# Patient Record
Sex: MALE | Race: OTHER | NOT HISPANIC OR LATINO | Employment: FULL TIME | URBAN - METROPOLITAN AREA
[De-identification: names, ages, dates, MRNs, and addresses within clinical notes are randomized per-mention and may not be internally consistent; named-entity substitution may affect disease eponyms.]

---

## 2018-11-15 ENCOUNTER — HISTORICAL (OUTPATIENT)
Dept: ENDOSCOPY | Facility: HOSPITAL | Age: 58
End: 2018-11-15

## 2019-06-22 ENCOUNTER — HISTORICAL (OUTPATIENT)
Dept: ADMINISTRATIVE | Facility: HOSPITAL | Age: 59
End: 2019-06-22

## 2020-12-16 ENCOUNTER — HISTORICAL (OUTPATIENT)
Dept: ADMINISTRATIVE | Facility: HOSPITAL | Age: 60
End: 2020-12-16

## 2020-12-16 LAB
ALBUMIN SERPL-MCNC: 4.4 GM/DL (ref 3.4–5)
ALBUMIN/GLOB SERPL: 1.83 {RATIO} (ref 1.5–2.5)
ALP SERPL-CCNC: 64 UNIT/L (ref 38–126)
ALT SERPL-CCNC: 31 UNIT/L (ref 7–52)
AST SERPL-CCNC: 31 UNIT/L (ref 15–37)
BILIRUB SERPL-MCNC: 0.4 MG/DL (ref 0.2–1)
BILIRUBIN DIRECT+TOT PNL SERPL-MCNC: 0.1 MG/DL (ref 0–0.5)
BILIRUBIN DIRECT+TOT PNL SERPL-MCNC: 0.3 MG/DL
BUN SERPL-MCNC: 11 MG/DL (ref 7–18)
CALCIUM SERPL-MCNC: 9.4 MG/DL (ref 8.5–10.1)
CHLORIDE SERPL-SCNC: 101 MMOL/L (ref 98–107)
CHOLEST SERPL-MCNC: 262 MG/DL (ref 0–200)
CHOLEST/HDLC SERPL: 8.2 {RATIO}
CO2 SERPL-SCNC: 28 MMOL/L (ref 21–32)
CREAT SERPL-MCNC: 1.08 MG/DL (ref 0.6–1.3)
EST. AVERAGE GLUCOSE BLD GHB EST-MCNC: 126 MG/DL
GLOBULIN SER-MCNC: 2.5 GM/DL (ref 1.2–3)
GLUCOSE SERPL-MCNC: 121 MG/DL (ref 74–106)
HBA1C MFR BLD: 6 % (ref 4.4–6.4)
HDLC SERPL-MCNC: 32 MG/DL (ref 35–60)
LDLC SERPL CALC-MCNC: 192 MG/DL (ref 0–129)
POTASSIUM SERPL-SCNC: 4.7 MMOL/L (ref 3.5–5.1)
PROT SERPL-MCNC: 6.8 GM/DL (ref 6.4–8.2)
PSA SERPL-MCNC: 0.39 NG/ML (ref 0–3.5)
SODIUM SERPL-SCNC: 139 MMOL/L (ref 136–145)
TRIGL SERPL-MCNC: 421 MG/DL (ref 30–150)
VLDLC SERPL CALC-MCNC: 84.2 MG/DL

## 2020-12-20 LAB — RAPID GROUP A STREP (OHS): NEGATIVE

## 2021-06-24 ENCOUNTER — HISTORICAL (OUTPATIENT)
Dept: ADMINISTRATIVE | Facility: HOSPITAL | Age: 61
End: 2021-06-24

## 2021-06-24 LAB
EST. AVERAGE GLUCOSE BLD GHB EST-MCNC: 114 MG/DL
HBA1C MFR BLD: 5.6 % (ref 4.4–6.4)

## 2021-10-07 LAB
CHOLEST SERPL-MCNC: 229 MG/DL
HDLC SERPL-MCNC: 33 MG/DL (ref 35–60)
LDLC SERPL CALC-MCNC: 141 MG/DL
TRIGL SERPL-MCNC: 390 MG/DL (ref 30–150)

## 2021-12-20 ENCOUNTER — HISTORICAL (OUTPATIENT)
Dept: ADMINISTRATIVE | Facility: HOSPITAL | Age: 61
End: 2021-12-20

## 2021-12-20 LAB
ALBUMIN SERPL-MCNC: 4.7 GM/DL (ref 3.4–5)
ALBUMIN/GLOB SERPL: 1.81 {RATIO} (ref 1.5–2.5)
ALP SERPL-CCNC: 69 UNIT/L (ref 38–126)
ALT SERPL-CCNC: 22 UNIT/L (ref 7–52)
AST SERPL-CCNC: 24 UNIT/L (ref 15–37)
BILIRUB SERPL-MCNC: 0.8 MG/DL (ref 0.2–1)
BILIRUBIN DIRECT+TOT PNL SERPL-MCNC: 0.1 MG/DL (ref 0–0.5)
BILIRUBIN DIRECT+TOT PNL SERPL-MCNC: 0.7 MG/DL
BUN SERPL-MCNC: 16 MG/DL (ref 7–18)
CALCIUM SERPL-MCNC: 9.9 MG/DL (ref 8.5–10.1)
CHLORIDE SERPL-SCNC: 101 MMOL/L (ref 98–107)
CHOLEST SERPL-MCNC: 272 MG/DL (ref 0–200)
CHOLEST/HDLC SERPL: 7.4 {RATIO}
CO2 SERPL-SCNC: 29 MMOL/L (ref 21–32)
CREAT SERPL-MCNC: 1.11 MG/DL (ref 0.6–1.3)
EST CREAT CLEARANCE SER (OHS): 87.16 ML/MIN
EST. AVERAGE GLUCOSE BLD GHB EST-MCNC: 111 MG/DL
GLOBULIN SER-MCNC: 2.6 GM/DL (ref 1.2–3)
GLUCOSE SERPL-MCNC: 120 MG/DL (ref 74–106)
HBA1C MFR BLD: 5.5 % (ref 4.4–6.4)
HDLC SERPL-MCNC: 37 MG/DL (ref 35–60)
LDLC SERPL CALC-MCNC: 170 MG/DL (ref 0–129)
POTASSIUM SERPL-SCNC: 4.8 MMOL/L (ref 3.5–5.1)
PROT SERPL-MCNC: 7.3 GM/DL (ref 6.4–8.2)
PSA SERPL-MCNC: 0.46 NG/ML (ref 0–3.5)
SODIUM SERPL-SCNC: 140 MMOL/L (ref 136–145)
TRIGL SERPL-MCNC: 444 MG/DL (ref 30–150)
VLDLC SERPL CALC-MCNC: 88.8 MG/DL

## 2022-04-10 ENCOUNTER — HISTORICAL (OUTPATIENT)
Dept: ADMINISTRATIVE | Facility: HOSPITAL | Age: 62
End: 2022-04-10

## 2022-04-29 VITALS
OXYGEN SATURATION: 97 % | OXYGEN SATURATION: 99 % | WEIGHT: 192.88 LBS | SYSTOLIC BLOOD PRESSURE: 149 MMHG | DIASTOLIC BLOOD PRESSURE: 80 MMHG | BODY MASS INDEX: 27.15 KG/M2 | DIASTOLIC BLOOD PRESSURE: 80 MMHG | HEIGHT: 70 IN | SYSTOLIC BLOOD PRESSURE: 132 MMHG | BODY MASS INDEX: 27.62 KG/M2 | WEIGHT: 189.63 LBS

## 2022-05-02 NOTE — HISTORICAL [facility]ER
This is a historical note converted from Shamar. Formatting and pictures may have been removed.  Please reference Shamar for original formatting and attached multimedia. Chief Complaint  wellness  History of Present Illness  61-year-old  male presents office today?for annual wellness examination recheck of chronic conditions including GERD, asthma. ?Carries with him a history of hyperlipidemia and impaired fasting glucose.? Overdue for colon cancer screening colonoscopy. ?Last colonoscopy 2015.? Up-to-date with?vaccination schedules.? Looks as though he may be due for the second Shingrix.  Last PSA in December 2020 was a result of 0.39.  [facility] a few months ago. Had CACS test. Uncertain of results.? Pulm At [facility]. Also sees Derm at [facility]. ?Currently using?daily maintenance inhaler Breo.  Not much?regular cardiovascular activity/physical exercise however stays physically active at his place of employment.? 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  Review of Systems  ?14 point Review of Systems performed with no exceptions for new complaints other than as noted in HPI.  Physical Exam  Vitals & Measurements  HR:?78(Peripheral)? BP:?132/80?  HT:?178?cm? HT:?178.00?cm? WT:?86?kg? WT:?86.000?kg? BMI:?27.14?  Well developed, well nourished, NAD, alert and oriented x4  Vitals reviewed  Head: NCAT  Eyes: EOMI, conjunctiva WNL, pupils symmetric  Ears: TMs and EACs clear  Nose: Mucosa WNL, No discharge, No sinus tenderness  O/P: No erythema or exudate  Neck: supple, FROM, no LA, no thyromegaly, no bruits auscultated  CV: RRR no MRG, peripheral pulses intact  Pulmonary: Effort normal and breath sounds normal, no wheeze  Abdomen: soft, NTND, no HSM; ? NABS; no peritoneal signs ? ??  Musculoskeletal: ?no tenderness, joints wnl for acute changes, FROM, no CCE  Skin: warm, dry, intact  Neuro: no motor/sensory deficits, cranial nerves grossly intact, cerebellar function appears  intact  Lymphadenopathy: no abnormalities noted  Psychiatric: Cooperative, appropriate mood and affect, appears to have normal judgment  ?  Assessment/Plan  1.?Wellness examination?Z00.00  ?Recommend 3-4 days of 30-45 minutes sessions of brisk walking/water aerobics/resistance training as tolerating. ?Recommend limiting excess simple carbohydrates from daily dietary regimen. recommend increase lean protein and vegetable matter.  Wellness labs today  Due for second Shingrix vaccination. ?Up-to-date with other?vaccinations.  Per his report he is up-to-date with colon cancer screenings.? request these reports.  Ordered:  Comprehensive Metabolic Panel, Routine collect, 12/20/21 8:20:00 CST, Blood, Stop date 12/20/21 8:20:00 CST, Lab Collect, Wellness examination, 12/20/21 8:20:00 CST  Lab Collection Request, 12/20/21 8:18:00 CST, [facility]LINK AMB - AFP, 12/20/21 8:18:00 CST, Wellness examination  Lipid Panel, Routine collect, 12/20/21 8:20:00 CST, Blood, Stop date 12/20/21 8:20:00 CST, Lab Collect, Wellness examination, 12/20/21 8:20:00 CST  Preventative Health Care Est 40-64 years [id number] PC, Wellness examination, HLINK AMB - AFP, 12/20/21 8:18:00 CST  Prostate Specific Antigen, Routine collect, 12/20/21 8:20:00 CST, Blood, Stop date 12/20/21 8:20:00 CST, Lab Collect, Wellness examination, 12/20/21 8:20:00 CST  Request Colonoscopy Results, 12/20/21 8:22:00 CST, Wellness examination  ?  2.?GERD - Gastro-esophageal reflux disease?K21.9  Well-controlled on current medication regimen. ?Continue as prescribed.  Ordered:  Clinic Follow up, *Est. 06/20/22 3:00:00 CDT, Order for future visit, IFG (impaired fasting glucose)  HLD (hyperlipidemia)  GERD - Gastro-esophageal reflux disease, HLink AFP  ?  3.?HLD (hyperlipidemia)?E78.5  ?Being closely followed at?[facility].? He is uncertain and/does not remember the name of his physician.  Ordered:  Clinic Follow up, *Est. 06/20/22 3:00:00 CDT, Order for future visit, IFG (impaired fasting  glucose)  HLD (hyperlipidemia)  GERD - Gastro-esophageal reflux disease, HLink AFP  ?  4.?IFG (impaired fasting glucose)?R73.01  ?Diet lifestyle modification as above. ?Repeat A1c/CMP  Ordered:  Clinic Follow up, *Est. 06/20/22 3:00:00 CDT, Order for future visit, IFG (impaired fasting glucose)  HLD (hyperlipidemia)  GERD - Gastro-esophageal reflux disease, HLink AFP  Hemoglobin A1c, Routine collect, 12/20/21 8:20:00 CST, Blood, Stop date 12/20/21 8:20:00 CST, Lab Collect, IFG (impaired fasting glucose), 12/20/21 8:20:00 CST  ?  5.?Asthma?J45.909  ?Seems to be doing well. ?Closely followed by his pulmonologist. ?He does not?recall remember the name of his?pulmonologist.  ?  6.?Encounter for vaccination?Z23  ?Second Shingrix vaccination today.  ?  Return to clinic in 6 months for?recheck.  Referrals  Clinic Follow up, *Est. 06/20/22 3:00:00 CDT, Order for future visit, IFG (impaired fasting glucose)  HLD (hyperlipidemia)  GERD - Gastro-esophageal reflux disease, HLink AFP   Problem List/Past Medical History  Ongoing  Abnormal EKG finding  Anxiety  Fatigue  GERD - Gastro-esophageal reflux disease  HLD (hyperlipidemia)  IFG (impaired fasting glucose)  Solitary pulmonary nodule  Historical  Hearing aid  Tobacco user  Wellness examination  Procedure/Surgical History  Colonoscopy (None) (11/15/2018)  Colonoscopy, flexible; with removal of tumor(s), polyp(s), or other lesion(s) by snare technique (11/15/2018)  Excision of Cecum, Via Natural or Artificial Opening Endoscopic (11/15/2018)  Excision of Rectum, Via Natural or Artificial Opening Endoscopic (11/15/2018)  Polypectomy (None) (11/15/2018)  Sinus surgery   Medications  biotin  Breo Ellipta 100 mcg-25 mcg/inh inhalation powder, 1 puff(s), INH, Daily  omeprazole 40 mg oral DR capsule, See Instructions  Ventolin HFA 90 mcg/inh inhalation aerosol, 2 puff(s), INH, q4hr  Vitamin D  Allergies  No Known Allergies  Social History  Abuse/Neglect  No,  12/20/2021  Alcohol  Current, Beer, 3-5 times per week, 03/07/2018  Home/Environment  Lives with Spouse., 03/07/2018  Substance Use  Never, 12/11/2018  Tobacco  10 or more cigarettes (1/2 pack or more)/day in last 30 days, No, 12/20/2021  Family History  Asthma: Father.  Immunizations  Vaccine Date Status   zoster vaccine, inactivated 12/20/2021 Given   influenza virus vaccine, inactivated 11/17/2021 Recorded   zoster vaccine, inactivated 06/24/2021 Given   COVID-19 MRNA, LNP-S, PF- Pfizer 03/25/2021 Given   COVID-19 MRNA, LNP-S, PF- Pfizer 03/04/2021 Given   tetanus/diphtheria/pertussis, acel(Tdap) 12/16/2020 Given   influenza virus vaccine, inactivated 11/13/2020 Recorded   influenza virus vaccine, inactivated 11/04/2019 Given   influenza virus vaccine, inactivated 11/15/2018 Recorded   influenza virus vaccine, inactivated 11/13/2017 Recorded   pneumococcal 23-polyvalent vaccine 12/03/2015 Recorded   influenza virus vaccine, inactivated 12/04/2014 Recorded   pneumococcal 13-valent conjugate vaccine 01/29/2014 Recorded   hepatitis A-hepatitis B vaccine 03/30/2010 Recorded   hepatitis A-hepatitis B vaccine 10/27/2009 Recorded   tetanus/diphtheria/pertussis, acel(Tdap) 09/14/2009 Recorded   hepatitis A-hepatitis B vaccine 09/14/2009 Recorded   Health Maintenance  Health Maintenance  ???Pending?(in the next year)  ??? ??Due?  ??? ? ? ?Colorectal Screening due??11/15/21??and every 3??year(s)  ??? ? ? ?ADL Screening due??12/20/21??and every 1??year(s)  ??? ? ? ?Lung Cancer Screening due??12/20/21??and every 1??year(s)  ??? ??Due In Future?  ??? ? ? ?Obesity Screening not due until??01/01/22??and every 1??year(s)  ??? ? ? ?Smoking Cessation not due until??01/01/22??and every 1??year(s)  ??? ? ? ?Alcohol Misuse Screening not due until??01/02/22??and every 1??year(s)  ??? ? ? ?Asthma Management-Asthma Medication Prescribed not due until??06/24/22??and every 1??year(s)  ??? ? ? ?Diabetes Screening not due  until??06/24/22??and every 1??year(s)  ???Satisfied?(in the past 1 year)  ??? ??Satisfied?  ??? ? ? ?Alcohol Misuse Screening on??12/20/21.??Satisfied by Robb James NP  ??? ? ? ?Aspirin Therapy for CVD Prevention on??12/20/21.??Satisfied by Robb James NP  ??? ? ? ?Asthma Management-Asthma Medication Prescribed on??08/23/21.??Satisfied by Arnold Lara MD  ??? ? ? ?Blood Pressure Screening on??12/20/21.??Satisfied by Selene Najera LPN  ??? ? ? ?Body Mass Index Check on??12/20/21.??Satisfied by Selene Najera LPN  ??? ? ? ?Depression Screening on??12/20/21.??Satisfied by Robb James NP  ??? ? ? ?Diabetes Screening on??06/24/21.??Satisfied by Micheal Knapp  ??? ? ? ?Influenza Vaccine on??11/17/21.??Satisfied by Selene Najera LPN  ??? ? ? ?Obesity Screening on??12/20/21.??Satisfied by Selene Najera LPN  ??? ? ? ?Smoking Cessation on??12/20/21.??Satisfied by Robb James NP  ?      Physician?was in the building when patient was?seen and examined by the nurse practitioner.? I concur with the?assessment/plan/management?of the patient.

## 2022-05-02 NOTE — HISTORICAL [facility] C[facility]ER
This is a historical note converted from Shamar. Formatting and pictures may have been removed.  Please reference Shamar for original formatting and attached multimedia. Chief Complaint  6 month recheck  History of Present Illness  stress level is getting better, still?doing well without Lexapro  he didnt do cacs,?he?does not really have much interest in doing that  He quit smoking in February  No new cardiac or respiratory complaints?even on exertion  Working on getting consistent with diet and exercise  Taking his?Prilosec daily without any problems  Discussed his?elevated sugar?6 months ago at 120, will repeat his A1c today  Review of Systems  No new complaints except as explained HPI  ?  Physical Exam  Vitals & Measurements  HR:?76(Peripheral)? BP:?122/80?  HT:?178?cm? HT:?178.00?cm? WT:?89.5?kg? WT:?89.500?kg? BMI:?28.25?  ?  PHYSICAL EXAM  ?   [facility] patient in NAD, ?AFVSS  HEENT - no acute abnormality  ??????????????? oropharynx WNL  HEART - RRR  LUNGS -? CTA  ABDOMEN - benign, NTND;? no peritoneal signs  EXTREMITIES - No CCE  SKIN - warm, dry, intact  PSYCH - affect appropriate;? alert and oriented  NEURO- no new deficits noted;? cranial nerves grossly intact  ?  Assessment/Plan  1.?GERD - Gastro-esophageal reflux disease?K21.9  ?Doing well as long as he takes his meds  Ordered:  Office/Outpatient Visit Level 4 Established [id number] PC, GERD - Gastro-esophageal reflux disease  IFG (impaired fasting glucose)  Solitary pulmonary nodule  Anxiety, [facility]LINK AMB - AFP, 06/24/21 8:44:00 CDT  ?  2.?Anxiety?F41.9  ?doing much better, still not requiring Lexapro  Ordered:  Office/Outpatient Visit Level 4 Established [id number] PC, GERD - Gastro-esophageal reflux disease  IFG (impaired fasting glucose)  Solitary pulmonary nodule  Anxiety, [facility]LINK AMB - AFP, 06/24/21 8:44:00 CDT  ?  3.?Solitary pulmonary nodule?R91.1  ?plan repeat chest CT-will?check with imaging center?and see if he has an appointment already and if not we will  have him call them to make 1  Ordered:  Office/Outpatient Visit Level 4 Established [id number] PC, GERD - Gastro-esophageal reflux disease  IFG (impaired fasting glucose)  Solitary pulmonary nodule  Anxiety, [facility]LINK AMB - AFP, 06/24/21 8:44:00 CDT  ?  4.?IFG (impaired fasting glucose)?R73.01  ?had sugar of 120 in december  check A1c today  Ordered:  Clinic Follow up, *Est. 12/24/21 3:00:00 CST, PLEASE MAKE THIS A 30 MINUTE CPX, Order for future visit, IFG (impaired fasting glucose), HLink AFP  Office/Outpatient Visit Level 4 Established [id number] PC, GERD - Gastro-esophageal reflux disease  IFG (impaired fasting glucose)  Solitary pulmonary nodule  Anxiety, HLINK AMB - AFP, 06/24/21 8:44:00 CDT  ?  5.?Encounter for vaccination?Z23  ?Shingrix No. 1  ?  6.?HLD (hyperlipidemia)?E78.5  ?Recommend coronary calcium score?since his last cholesterol was?about 260;?discussed the benefits of?knowing if he has plaque in his coronary arteries;?he will let us know if he changes his mind  ?  Orders:  CT Lung Cancer Screening, Routine, 09/24/21 10:12:00 CDT, Other (please specify), Lung cancer screening, >= 30 pk-yr smoking history in last 15 yrs, None, Ambulatory, [facility] He is due in September 2021 Please call patient to schedule, Rad Type, Order for future v...  shingrix #1 today  rtc 6 months cpx  Referrals  Clinic Follow up, *Est. 12/24/21 3:00:00 CST, PLEASE MAKE THIS A 30 MINUTE CPX, Order for future visit, IFG (impaired fasting glucose), HLink AFP  Clinic Follow up, Order for future visit   Problem List/Past Medical History  Ongoing  Abnormal EKG finding  Anxiety  Fatigue  GERD - Gastro-esophageal reflux disease  HLD (hyperlipidemia)  IFG (impaired fasting glucose)  Solitary pulmonary nodule  Historical  Hearing aid  Tobacco user  Wellness examination  Procedure/Surgical History  Colonoscopy (None) (11/15/2018)  Colonoscopy, flexible; with removal of tumor(s), polyp(s), or other lesion(s) by snare technique  (11/15/2018)  Excision of Cecum, Via Natural or Artificial Opening Endoscopic (11/15/2018)  Excision of Rectum, Via Natural or Artificial Opening Endoscopic (11/15/2018)  Polypectomy (None) (11/15/2018)  Sinus surgery   Medications  biotin  Breo Ellipta 100 mcg-25 mcg/inh inhalation powder, 1 puff(s), INH, Daily  omeprazole 40 mg oral DR capsule, See Instructions  Vitamin D  Allergies  No Known Allergies  Social History  Abuse/Neglect  No, 06/24/2021  Alcohol  Current, Beer, 3-5 times per week, 03/07/2018  Home/Environment  Lives with Spouse., 03/07/2018  Substance Use  Never, 12/11/2018  Tobacco  10 or more cigarettes (1/2 pack or more)/day in last 30 days, No, 06/24/2021  Family History  Asthma: Father.  Immunizations  Vaccine Date Status   zoster vaccine, inactivated 06/24/2021 Given   COVID-19 MRNA, LNP-S, PF- Pfizer 03/25/2021 Given   COVID-19 MRNA, LNP-S, PF- Pfizer 03/04/2021 Given   tetanus/diphtheria/pertussis, acel(Tdap) 12/16/2020 Given   influenza virus vaccine, inactivated 11/13/2020 Recorded   influenza virus vaccine, inactivated 11/04/2019 Given   influenza virus vaccine, inactivated 11/15/2018 Recorded   influenza virus vaccine, inactivated 11/13/2017 Recorded   pneumococcal 23-polyvalent vaccine 12/03/2015 Recorded   influenza virus vaccine, inactivated 12/04/2014 Recorded   pneumococcal 13-valent conjugate vaccine 01/29/2014 Recorded   hepatitis A-hepatitis B vaccine 03/30/2010 Recorded   hepatitis A-hepatitis B vaccine 10/27/2009 Recorded   tetanus/diphtheria/pertussis, acel(Tdap) 09/14/2009 Recorded   hepatitis A-hepatitis B vaccine 09/14/2009 Recorded   Health Maintenance  Health Maintenance  ???Pending?(in the next year)  ??? ??OverDue  ??? ? ? ?Alcohol Misuse Screening due??01/02/21??and every 1??year(s)  ??? ??Due?  ??? ? ? ?ADL Screening due??06/24/21??and every 1??year(s)  ??? ? ? ?Aspirin Therapy for CVD Prevention due??06/24/21??and every 1??year(s)  ??? ? ? ?Depression Screening  due??06/24/21??Unknown Frequency  ??? ??Due In Future?  ??? ? ? ?Influenza Vaccine not due until??10/01/21??and every 1??day(s)  ??? ? ? ?Colorectal Screening not due until??11/15/21??and every 3??year(s)  ??? ? ? ?Diabetes Screening not due until??12/16/21??and every 1??year(s)  ??? ? ? ?Obesity Screening not due until??01/01/22??and every 1??year(s)  ???Satisfied?(in the past 1 year)  ??? ??Satisfied?  ??? ? ? ?Asthma Management-Asthma Medication Prescribed on??06/24/21.  ??? ? ? ?Blood Pressure Screening on??06/24/21.??Satisfied by Selene Najera LPN  ??? ? ? ?Body Mass Index Check on??06/24/21.??Satisfied by Selene Najera LPN  ??? ? ? ?Diabetes Screening on??06/24/21.??Satisfied by Micheal Knapp  ??? ? ? ?Influenza Vaccine on??11/13/20.??Satisfied by Selene Najera LPN  ??? ? ? ?Lipid Screening on??12/16/20.??Satisfied by Crista Banda  ??? ? ? ?Obesity Screening on??06/24/21.??Satisfied by Selene Najera LPN  ??? ? ? ?Tetanus Vaccine on??12/16/20.??Satisfied by Destini Weinstein MA  ?

## 2022-05-02 NOTE — HISTORICAL [facility]
This is a historical note converted from Shamar. Formatting and pictures may have been removed.  Please reference Shamar for original formatting and attached multimedia. Chief Complaint  hx of asthma, sob since last night, tried breathing tx this morning with little relief  History of Present Illness  58-year-old?known asthmatic?presents to clinic with acute onset shortness of breath since last night.? States?felt?acid reflux?triggered the symptoms.? Complains of?shortness of breath and wheezing.? Breathing treatments 6 AM today some relief.? No fever, no chest pain, no palpitations.? Smokes tobacco.  Review of Systems  Constitutional : _No fatigue, No weakness,?No Fever  HEENT : _No sore throat,?no difficulty swallowing  Neck : Negative except as documented in History of present illness  Respiratory : _Coughing,?shortness of breath and wheezing  Cardiovascular : _No chest pain, no palpitations, no edema  Gastrointestinal : _No nausea,?vomiting or diarrhea.? No abdominal pain  Integumentary : _No skin rash or abnormal lesion  Neurologic_no headache, no dizziness  Physical Exam  Vitals & Measurements  T:?36.9? ?C (Oral)? HR:?100(Peripheral)? RR:?25? BP:?149/80? SpO2:?99%?  HT:?178?cm? WT:?87.5?kg?  General : Alert, oriented,?no apparent distress, Afebrile  Neck - supple, no lymphadenopathy  HENT :_. ?Pharynx mild redness and swelling, no exudate  Respiratory : Bilateral?coarse breath sounds,?expiratory wheezing.? Rhonchi?left lung fields  Cardiovascular :_Regular Heart rate and rhythm?without murmurs  Integumentary : No rashes  ?   After the DuoNeb treatment:?Air movements much improved bilateral.? Scattered rhonchi and expiratory wheezing.? O2 sats 94-95%  Assessment/Plan  1.?Acute asthma exacerbation?J45.901  ? Discussed the physical findings, Celestone 6 mg IM today.? DuoNeb?breathing treatment. ?Patient tolerated well.? With improving symptoms discharged home with spouse.  Continue?Ventolin as needed?for asthma  symptoms.? Caution with jitteriness.? Medications as directed.? ER precautions with any acute change in symptoms  Ordered:  amoxicillin, 875 mg = 1 tab(s), Oral, BID, X 7 day(s), # 14 tab(s), 0 Refill(s), Pharmacy: [id number]  Office/Outpatient Visit Level 3 Established [id number] PC, Acute asthma exacerbation  Acute dyspnea, [facility], 06/22/19 16:22:00 CDT  ?  2.?Acute dyspnea?R06.00  ? reviewed the x-rays, no concerns of acute finding.? Radiology final results will be monitored and reported.? With any acute change call or return to clinic. ?Follow-up with primary M.D.  Ordered:  Office/Outpatient Visit Level 3 Established [id number] PC, Acute asthma exacerbation  Acute dyspnea, [facility]-RR, 06/22/19 16:22:00 CDT  ?  Orders:  pantoprazole, 40 mg = 1 tab(s), Oral, Daily, # 60 tab(s), 0 Refill(s), Pharmacy: [id number]   Problem List/Past Medical History  Ongoing  Abnormal EKG finding  Fatigue  GERD - Gastro-esophageal reflux disease  H/O: asthma  Hearing aid  High cholesterol  Solitary pulmonary nodule  Tobacco user  Historical  Wellness examination  Procedure/Surgical History  Colonoscopy (None) (11/15/2018)  Colonoscopy, flexible; with removal of tumor(s), polyp(s), or other lesion(s) by snare technique (11/15/2018)  Excision of Cecum, Via Natural or Artificial Opening Endoscopic (11/15/2018)  Excision of Rectum, Via Natural or Artificial Opening Endoscopic (11/15/2018)  Polypectomy (None) (11/15/2018)  Sinus surgery   Medications  Advair HFA 45 mcg-21 mcg/inh inhalation aerosol, 2 puff(s), INH, BID  amoxicillin 875 mg oral tablet, 875 mg= 1 tab(s), Oral, BID  aspirin 81 mg oral tablet  ATORVASTATIN 40 MG TABLET, 40 mg= 1 tab(s), Oral, Daily  biotin  escitalopram 10 mg oral tablet, See Instructions, 2 refills  Protonix 40 mg ORAL enteric coated tablet, 40 mg= 1 tab(s), Oral, Daily  Ventolin HFA 90 mcg/inh inhalation aerosol, See Instructions, 1 refills  Vitamin D  Allergies  No Known Allergies  No Known  Medication Allergies  Social History  Abuse/Neglect  No, 06/22/2019  Alcohol  Current, Beer, 3-5 times per week, 03/07/2018  Home/Environment  Lives with Spouse., 03/07/2018  Substance Use  Never, 12/11/2018  Tobacco  10 or more cigarettes (1/2 pack or more)/day in last 30 days, N/A, 06/22/2019  Family History  Asthma: Father.  Immunizations  Vaccine Date Status   influenza virus vaccine, inactivated 11/13/2017 Recorded   pneumococcal 23-polyvalent vaccine 12/03/2015 Recorded   pneumococcal 13-valent conjugate vaccine 01/29/2014 Recorded   hepatitis A-hepatitis B vaccine 03/30/2010 Recorded   hepatitis A-hepatitis B vaccine 10/27/2009 Recorded   tetanus/diphtheria/pertussis, acel(Tdap) 09/14/2009 Recorded   hepatitis A-hepatitis B vaccine 09/14/2009 Recorded   Health Maintenance  Health Maintenance  ???Pending?(in the next year)  ??? ??OverDue  ??? ? ? ?Diabetes Screening due??and every?  ??? ? ? ?Alcohol Misuse Screening due??01/01/19??and every 1??year(s)  ??? ? ? ?Smoking Cessation due??01/01/19??and every 1??year(s)  ??? ??Due?  ??? ? ? ?ADL Screening due??06/22/19??and every 1??year(s)  ??? ? ? ?Asthma Management-Asthma Education due??06/22/19??and every 6??month(s)  ??? ? ? ?Asthma Management-Spirometry due??06/22/19??Variable frequency  ??? ? ? ?Asthma Management-[place] Flow due??06/22/19??Variable frequency  ??? ? ? ?Asthma Management-Written Action Plan due??06/22/19??and every 6??month(s)  ??? ? ? ?Depression Screening due??06/22/19??and every?  ??? ? ? ?Lung Cancer Screening due??06/22/19??and every 1??year(s)  ??? ??Due In Future?  ??? ? ? ?Tetanus Vaccine not due until??09/14/19??and every 10??year(s)  ??? ? ? ?Aspirin Therapy for CVD Prevention not due until??11/15/19??and every 1??year(s)  ??? ? ? ?Obesity Screening not due until??01/01/20??and every 1??year(s)  ??? ? ? ?Blood Pressure Screening not due until??02/18/20??and every 1??year(s)  ??? ? ? ?Body Mass Index Check not due  until??02/18/20??and every 1??year(s)  ???Satisfied?(in the past 1 year)  ??? ??Satisfied?  ??? ? ? ?Aspirin Therapy for CVD Prevention on??11/15/18.  ??? ? ? ?Asthma Management-Asthma Medication Prescribed on??06/22/19.??Satisfied by Janet Sethi MD  ??? ? ? ?Blood Pressure Screening on??06/22/19.??Satisfied by Girish Vidal LPN.  ??? ? ? ?Body Mass Index Check on??02/18/19.??Satisfied by Melissa Wiggins  ??? ? ? ?Diabetes Screening on??12/11/18.??Satisfied by Micheal Knapp  ??? ? ? ?Influenza Vaccine on??11/15/18.??Satisfied by Donna OHARA, Risa Millard  ??? ? ? ?Lipid Screening on??12/11/18.??Satisfied by Micheal Knapp  ??? ? ? ?Obesity Screening on??06/22/19.??Satisfied by Girish Vidal LPN.  ?  ?

## 2022-05-02 NOTE — HISTORICAL [facility] CERNER
This is a historical note converted from Shamar. Formatting and pictures may have been removed.  Please reference Shamar for original formatting and attached multimedia. Chief Complaint  CPX  History of Present Illness  60-year-old  male presents to office today for scheduled annual wellness exam and recheck of chronic conditions including?GERD and fatigue/anxiety.? Reports that he is up-to-date with health maintenance preventive screenings. ?He expresses that he obtained?most recent colon cancer screening colonoscopy in 2019. ?I will request nursing to obtain these records.? He is due for a Tdap vaccination. ?He will receive this?during the office visit today. ?He is already received the 2020 influenza vaccination.? Received both Prevnar 13 and Pneumovax 23?vaccinations. ?We will repeat these beginning at age 65.? Last PSA?0.88. ?He is a current tobacco user. ?Reports?approximately half pack per day?use.? Also refuses?dips/snuff.? Reports that 1 can will last him approximately 3 days.? Reports that he primarily?uses smokeless tobacco?while at work. ?He is employed full-time at [employer]. ?Plans to retire in the next 1-2 years.? Expresses that he is no longer taking?prescription Lexapro for his?anxiety.? Expresses that he found that the medication did not do a whole lot for me.? A lengthy discussion was had concerning the various?modalities and treatment options for anxiety.? He expresses that he will notify us?if he desires to?try other modalities.? No cardiovascular exercise. ?No particular dietary restrictions/limitations from daily dietary intake.? Option for Shingrix vaccination was discussed?at length. ?He expresses that he will?discuss this with his wife and notify us if he?desires to receive the Shingrix vaccination series.  Review of Systems  ?  ?14 point Review of Systems performed with no exceptions for new complaints other than as noted in HPI.  ?  Physical Exam  Vitals &  Measurements  HR:?80(Peripheral)? BP:?130/88?  HT:?178?cm? WT:?90.6?kg?  ?  PHYSICAL EXAM  ?   WDWN patient in NAD, ?AFVSS  HEENT - no acute abnormality  ??????????????? oropharynx WNL  HEART - RRR  LUNGS -? CTA  ABDOMEN - benign, NTND;? no peritoneal signs  EXTREMITIES - No CCE  SKIN - warm, dry, intact  PSYCH - affect appropriate;? alert and oriented  NEURO- no new deficits noted;? cranial nerves grossly intact  ?  Assessment/Plan  1.?Wellness examination?Z00.00  ?Wellness labs today. ?Recommend initiating some cardiovascular exercise 3-4 days weekly.? Recommend limiting simple carbohydrates from daily dietary intake.? Please obtain?records from gastroenterology/colon cancer screening colonoscopy records.? Will check PSA level today.  Ordered:  Comprehensive Metabolic Panel, Routine collect, 12/16/20 8:09:00 CST, Blood, Order for future visit, Stop date 12/16/20 8:09:00 CST, Lab Collect, Wellness examination, 12/16/20 8:09:00 CST  Lipase Level, Routine collect, 12/16/20 8:09:00 CST, Blood, Order for future visit, Stop date 12/16/20 8:09:00 CST, Lab Collect, Wellness examination, 12/16/20 8:09:00 CST  Preventative Health Care Est 40-64 years [id number] PC, Wellness examination, [facility], 12/16/20 8:09:00 CST  Prostate Specific Antigen, Routine collect, 12/16/20 8:09:00 CST, Blood, Order for future visit, Stop date 12/16/20 8:09:00 CST, Lab Collect, Wellness examination, 12/16/20 8:09:00 CST  ?  2.?Tobacco user?Z72.0  ?Nicotine/tobacco?use cessation discussed at length.  Ordered:  Lab Collection Request, 12/16/20 8:09:00 CST, [facility] - [facility], 12/16/20 8:09:00 CST, Tobacco user  Office/Outpatient Visit Level 2 Established [id number] PC, Tobacco user  Fatigue  GERD - Gastro-esophageal reflux disease, [facility], 12/16/20 8:09:00 CST  ?  3.?Fatigue?R53.83  ?Stable. ?Expresses he is?attempting to limit?excess stressors?from his workplace environment.  Ordered:  Clinic Follow up, *Est. 06/16/21 3:00:00 CDT, Order for  future visit, Fatigue  GERD - Gastro-esophageal reflux disease, [facility]  Office/Outpatient Visit Level 2 Established [id number] PC, Tobacco user  Fatigue  GERD - Gastro-esophageal reflux disease, [facility], 12/16/20 8:09:00 CST  ?  4.?GERD - Gastro-esophageal reflux disease?K21.9  ?Well-controlled on prescription medication. ?Continue as prescribed.  Ordered:  Clinic Follow up, *Est. 06/16/21 3:00:00 CDT, Order for future visit, Fatigue  GERD - Gastro-esophageal reflux disease, [facility]  Office/Outpatient Visit Level 2 Established [id number] PC, Tobacco user  Fatigue  GERD - Gastro-esophageal reflux disease, [facility], 12/16/20 8:09:00 CST  ?  5.?Anxiety?F41.9  ?Lengthy discussion as?mentioned in HPI.? He is to notify us if he?desires?alternate treatment modalities/medication?options for treatment of anxiety.  ?  Orders:  Hemoglobin A1c, Routine collect, 12/16/20 8:09:00 CST, Blood, Order for future visit, Stop date 12/16/20 8:09:00 CST, Lab Collect, Elevated fasting glucose, 12/16/20 8:09:00 CST  Referrals  Clinic Follow up, *Est. 06/16/21 3:00:00 CDT, Order for future visit, Fatigue  GERD - Gastro-esophageal reflux disease, [facility]   Problem List/Past Medical History  Ongoing  Abnormal EKG finding  Anxiety  Fatigue  GERD - Gastro-esophageal reflux disease  Hearing aid  Solitary pulmonary nodule  Tobacco user  Historical  Wellness examination  Procedure/Surgical History  Colonoscopy (None) (11/15/2018)  Colonoscopy, flexible; with removal of tumor(s), polyp(s), or other lesion(s) by snare technique (11/15/2018)  Excision of Cecum, Via Natural or Artificial Opening Endoscopic (11/15/2018)  Excision of Rectum, Via Natural or Artificial Opening Endoscopic (11/15/2018)  Polypectomy (None) (11/15/2018)  Sinus surgery   Medications  albuterol CFC free 90 mcg/inh inhalation aerosol with adapter, 2 puff(s), INH, QID, PRN  aspirin 81 mg oral tablet  biotin  Breo Ellipta 200 mcg-25 mcg/inh inhalation powder, 1  puff(s), INH, Daily  escitalopram 10 mg oral tablet, See Instructions,? ?Not taking  omeprazole 40 mg oral DR capsule, 40 mg= 1 cap(s), Oral, Daily  Ventolin HFA 90 mcg/inh inhalation aerosol, See Instructions  Vitamin D  Allergies  No Known Allergies  Social History  Abuse/Neglect  No, 12/16/2020  No, 12/12/2019  Alcohol  Current, Beer, 3-5 times per week, 03/07/2018  Home/Environment  Lives with Spouse., 03/07/2018  Substance Use  Never, 12/11/2018  Tobacco  10 or more cigarettes (1/2 pack or more)/day in last 30 days, No, 12/16/2020  10 or more cigarettes (1/2 pack or more)/day in last 30 days, No, 12/12/2019  Family History  Asthma: Father.  Immunizations  Vaccine Date Status   tetanus/diphtheria/pertussis, acel(Tdap) 12/16/2020 Given   influenza virus vaccine, inactivated 11/13/2020 Recorded   influenza virus vaccine, inactivated 11/04/2019 Given   influenza virus vaccine, inactivated 11/15/2018 Recorded   influenza virus vaccine, inactivated 11/13/2017 Recorded   pneumococcal 23-polyvalent vaccine 12/03/2015 Recorded   influenza virus vaccine, inactivated 12/04/2014 Recorded   pneumococcal 13-valent conjugate vaccine 01/29/2014 Recorded   hepatitis A-hepatitis B vaccine 03/30/2010 Recorded   hepatitis A-hepatitis B vaccine 10/27/2009 Recorded   tetanus/diphtheria/pertussis, acel(Tdap) 09/14/2009 Recorded   hepatitis A-hepatitis B vaccine 09/14/2009 Recorded   Health Maintenance  Health Maintenance  ???Pending?(in the next year)  ??? ??OverDue  ??? ? ? ?Aspirin Therapy for CVD Prevention due??11/15/19??and every 1??year(s)  ??? ? ? ?Obesity Screening due??01/01/20??and every 1??year(s)  ??? ? ? ?Smoking Cessation due??01/01/20??and every 1??year(s)  ??? ? ? ?Alcohol Misuse Screening due??01/02/20??and every 1??year(s)  ??? ? ? ?Asthma Management-Asthma Medication Prescribed due??12/11/20??and every 1??year(s)  ??? ??Due?  ??? ? ? ?Body Mass Index Check due??12/11/20??and every 1??year(s)  ??? ? ? ?ADL Screening  due??12/16/20??and every 1??year(s)  ??? ? ? ?Depression Screening due??12/16/20??Unknown Frequency  ??? ? ? ?Lung Cancer Screening due??12/16/20??and every 1??year(s)  ??? ? ? ?Zoster Vaccine due??12/16/20??Unknown Frequency  ??? ??Due In Future?  ??? ? ? ?Influenza Vaccine not due until??10/01/21??and every 1??day(s)  ???Satisfied?(in the past 1 year)  ??? ??Satisfied?  ??? ? ? ?Asthma Management-Asthma Medication Prescribed on??01/27/20.??Satisfied by Arnold Lara MD  ??? ? ? ?Blood Pressure Screening on??12/16/20.??Satisfied by Destini Weinstein MA  ??? ? ? ?Influenza Vaccine on??11/13/20.??Satisfied by Selene Najera LPN  ??? ? ? ?Tetanus Vaccine on??12/16/20.??Satisfied by Destini Weinstein MA  ?      Physician?was in the building when patient was?seen and examined by the nurse practitioner.? I concur with the?assessment/plan/management?of the patient.

## 2022-06-21 PROBLEM — K21.9 GASTROESOPHAGEAL REFLUX DISEASE: Status: ACTIVE | Noted: 2022-06-21

## 2022-06-21 PROBLEM — F41.1 GENERALIZED ANXIETY DISORDER: Status: ACTIVE | Noted: 2022-06-21

## 2022-06-21 PROBLEM — J43.2 CENTRILOBULAR EMPHYSEMA: Status: ACTIVE | Noted: 2022-06-21

## 2022-06-21 PROBLEM — R73.01 IMPAIRED FASTING GLUCOSE: Status: ACTIVE | Noted: 2022-06-21

## 2022-06-21 PROBLEM — R94.31 ABNORMAL ELECTROCARDIOGRAPHY: Status: ACTIVE | Noted: 2022-06-21

## 2022-06-21 PROBLEM — E78.5 HYPERLIPIDEMIA: Status: ACTIVE | Noted: 2022-06-21

## 2022-06-21 PROBLEM — R53.83 FATIGUE: Status: ACTIVE | Noted: 2022-06-21

## 2022-06-21 PROBLEM — R91.1 SOLITARY PULMONARY NODULE: Status: ACTIVE | Noted: 2022-06-21

## 2022-09-17 ENCOUNTER — HISTORICAL (OUTPATIENT)
Dept: ADMINISTRATIVE | Facility: HOSPITAL | Age: 62
End: 2022-09-17

## 2022-12-21 PROBLEM — F41.9 ANXIETY DISORDER, UNSPECIFIED: Status: ACTIVE | Noted: 2022-06-21

## 2023-02-28 PROCEDURE — 86803 HEPATITIS C AB TEST: CPT | Performed by: FAMILY MEDICINE

## 2024-07-30 PROBLEM — E78.2 MIXED HYPERLIPIDEMIA: Status: ACTIVE | Noted: 2022-06-21

## 2024-11-14 ENCOUNTER — OFFICE VISIT (OUTPATIENT)
Dept: URGENT CARE | Facility: CLINIC | Age: 64
End: 2024-11-14
Payer: COMMERCIAL

## 2024-11-14 VITALS
TEMPERATURE: 98 F | HEART RATE: 73 BPM | DIASTOLIC BLOOD PRESSURE: 89 MMHG | SYSTOLIC BLOOD PRESSURE: 145 MMHG | HEIGHT: 70 IN | RESPIRATION RATE: 17 BRPM | WEIGHT: 166 LBS | OXYGEN SATURATION: 96 % | BODY MASS INDEX: 23.77 KG/M2

## 2024-11-14 DIAGNOSIS — J32.9 SINUSITIS, UNSPECIFIED CHRONICITY, UNSPECIFIED LOCATION: Primary | ICD-10-CM

## 2024-11-14 DIAGNOSIS — J45.901 EXACERBATION OF ASTHMA, UNSPECIFIED ASTHMA SEVERITY, UNSPECIFIED WHETHER PERSISTENT: ICD-10-CM

## 2024-11-14 RX ORDER — PREDNISONE 10 MG/1
30 TABLET ORAL DAILY
Qty: 9 TABLET | Refills: 0 | Status: SHIPPED | OUTPATIENT
Start: 2024-11-14 | End: 2024-11-17

## 2024-11-14 RX ORDER — DOXYCYCLINE 100 MG/1
100 CAPSULE ORAL 2 TIMES DAILY
Qty: 14 CAPSULE | Refills: 0 | Status: SHIPPED | OUTPATIENT
Start: 2024-11-14 | End: 2024-11-21

## 2024-11-14 NOTE — PROGRESS NOTES
"Subjective:      Patient ID: Ronald Mondragon is a 64 y.o. male.    Vitals:  height is 5' 10" (1.778 m) and weight is 75.3 kg (166 lb). His temperature is 98.3 °F (36.8 °C). His blood pressure is 145/89 (abnormal) and his pulse is 73. His respiration is 17 and oxygen saturation is 96%.     Chief Complaint: Cough     Patient is a 64 y.o. male who presents to urgent care with complaints of cough, sinus congestion, runny nose, sinus pressure, chest congestion the past 3 days.  States he does have asthma and it has been flaring up.  Has had some shortness of breath and wheezing.  Has been using his albuterol inhaler.  Denies any fever.  Alleviating factors include mucinex with no relief.         Constitution: Negative.   HENT:  Positive for sinus pressure.    Neck: neck negative.   Cardiovascular: Negative.    Eyes: Negative.    Respiratory:  Positive for cough and wheezing.    Gastrointestinal: Negative.    Genitourinary: Negative.    Musculoskeletal: Negative.    Skin: Negative.    Allergic/Immunologic: Negative.    Neurological: Negative.    Hematologic/Lymphatic: Negative.       Objective:     Physical Exam   Constitutional: He is oriented to person, place, and time. He appears well-developed. He is cooperative.  Non-toxic appearance. He does not appear ill. No distress.   HENT:   Head: Normocephalic and atraumatic.   Ears:   Right Ear: Hearing and external ear normal.   Left Ear: Hearing and external ear normal.   Mouth/Throat: Mucous membranes are normal. No oropharyngeal exudate or posterior oropharyngeal erythema (postnasal drip).   Eyes: Conjunctivae and lids are normal.   Neck: Trachea normal and phonation normal. Neck supple. No edema present. No erythema present. No neck rigidity present.   Cardiovascular: Normal rate, regular rhythm and normal heart sounds.   Pulmonary/Chest: Effort normal and breath sounds normal. No stridor. No respiratory distress. He has no decreased breath sounds. He has no wheezes. He " has no rhonchi. He has no rales.   Abdominal: Normal appearance.   Lymphadenopathy:     He has no cervical adenopathy.   Neurological: He is alert and oriented to person, place, and time. He exhibits normal muscle tone.   Skin: Skin is warm, intact and not diaphoretic.   Psychiatric: His speech is normal and behavior is normal. Mood, judgment and thought content normal.   Nursing note and vitals reviewed.         Previous History      Review of patient's allergies indicates:   Allergen Reactions    Benadryl allergy decongestant Other (See Comments)       Past Medical History:   Diagnosis Date    Anxiety disorder, unspecified     Fatigue     GERD (gastroesophageal reflux disease)     IFG (impaired fasting glucose)     Solitary pulmonary nodule      Current Outpatient Medications   Medication Instructions    ADVAIR DISKUS 100-50 mcg/dose diskus inhaler INHALE 1 PUFF INTO THE LUNGS TWICE A DAY 12 (TWELVE) HOURS APART.    albuterol (PROVENTIL/VENTOLIN HFA) 90 mcg/actuation inhaler Inhale into the lungs.    cloNIDine (CATAPRES) 0.1 mg, Oral, 2 times daily    doxycycline (MONODOX) 100 mg, Oral, 2 times daily    montelukast (SINGULAIR) 10 mg, Oral, Nightly    multivitamin (THERAGRAN) per tablet 1 tablet, Daily    pantoprazole (PROTONIX) 40 mg, Oral    pravastatin (PRAVACHOL) 40 mg, Daily    predniSONE (DELTASONE) 30 mg, Oral, Daily    triamcinolone acetonide 0.025% (KENALOG) 0.025 % Oint Topical (Top), 2 times daily     Past Surgical History:   Procedure Laterality Date    SINUS SURGERY       Family History   Problem Relation Name Age of Onset    Asthma Father Camelia        Social History     Tobacco Use    Smoking status: Former     Current packs/day: 1.00     Types: Cigarettes    Smokeless tobacco: Current     Types: Snuff   Substance Use Topics    Alcohol use: Not Currently    Drug use: Not Currently        Physical Exam      Vital Signs Reviewed   BP (!) 145/89   Pulse 73   Temp 98.3 °F (36.8 °C)   Resp 17    "Ht 5' 10" (1.778 m)   Wt 75.3 kg (166 lb)   SpO2 96%   BMI 23.82 kg/m²        Procedures    Procedures     Labs     Results for orders placed or performed in visit on 11/08/24   Testosterone    Collection Time: 11/08/24  7:12 AM   Result Value Ref Range    Testosterone Total 302.12 300.00 - 1,060.00 ng/dL       Assessment:     1. Sinusitis, unspecified chronicity, unspecified location    2. Exacerbation of asthma, unspecified asthma severity, unspecified whether persistent        Plan:   Medications sent to pharmacy  Start antibiotics today  Start oral steroids tomorrow  Continue using your albuterol inhaler as needed for shortness of breath or wheezing  Start taking an allergy pill daily such as claritin, zyrtec, allegrea or xyzal. Also start using a nasal steroid spray such as flonase or nasacort daily. If you are not being treated for high blood pressure, you can also take decongestant such as sudafed as needed. They can be purchased over the counter. If oral steroids were prescribed, start them tomorrow morning. Monitor for fever. Take tylenol/acetaminophen or ibuprofen as needed. Rest and hydrate. If symptoms persist or worsen, return to clinic or seek medical attention immediately.       Sinusitis, unspecified chronicity, unspecified location    Exacerbation of asthma, unspecified asthma severity, unspecified whether persistent    Other orders  -     methylPREDNISolone sod suc(PF) injection 125 mg  -     predniSONE (DELTASONE) 10 MG tablet; Take 3 tablets (30 mg total) by mouth once daily. for 3 days  Dispense: 9 tablet; Refill: 0  -     doxycycline (MONODOX) 100 MG capsule; Take 1 capsule (100 mg total) by mouth 2 (two) times daily. for 7 days  Dispense: 14 capsule; Refill: 0                    "

## 2024-11-14 NOTE — PATIENT INSTRUCTIONS
Plan:   Medications sent to pharmacy  Start antibiotics today  Start oral steroids tomorrow  Continue using your albuterol inhaler as needed for shortness of breath or wheezing  Start taking an allergy pill daily such as claritin, zyrtec, allegrea or xyzal. Also start using a nasal steroid spray such as flonase or nasacort daily. If you are not being treated for high blood pressure, you can also take decongestant such as sudafed as needed. They can be purchased over the counter. If oral steroids were prescribed, start them tomorrow morning. Monitor for fever. Take tylenol/acetaminophen or ibuprofen as needed. Rest and hydrate. If symptoms persist or worsen, return to clinic or seek medical attention immediately.

## 2025-02-17 ENCOUNTER — HOSPITAL ENCOUNTER (OUTPATIENT)
Dept: RADIOLOGY | Facility: HOSPITAL | Age: 65
Discharge: HOME | End: 2025-02-17
Attending: FAMILY MEDICINE
Payer: COMMERCIAL

## 2025-02-17 DIAGNOSIS — Z12.2 SCREENING FOR MALIGNANT NEOPLASM OF RESPIRATORY ORGAN: ICD-10-CM

## 2025-02-17 PROCEDURE — 71271 CT THORAX LUNG CANCER SCR C-: CPT | Mod: TC
